# Patient Record
Sex: FEMALE | Race: WHITE | URBAN - METROPOLITAN AREA
[De-identification: names, ages, dates, MRNs, and addresses within clinical notes are randomized per-mention and may not be internally consistent; named-entity substitution may affect disease eponyms.]

---

## 2019-10-01 ENCOUNTER — DOCUMENTATION ONLY (OUTPATIENT)
Dept: ONCOLOGY | Age: 64
End: 2019-10-01

## 2019-10-02 ENCOUNTER — DOCUMENTATION ONLY (OUTPATIENT)
Dept: ONCOLOGY | Age: 64
End: 2019-10-02

## 2019-10-02 ENCOUNTER — OFFICE VISIT (OUTPATIENT)
Dept: ONCOLOGY | Age: 64
End: 2019-10-02

## 2019-10-02 VITALS
RESPIRATION RATE: 18 BRPM | DIASTOLIC BLOOD PRESSURE: 84 MMHG | OXYGEN SATURATION: 96 % | HEART RATE: 106 BPM | HEIGHT: 68 IN | SYSTOLIC BLOOD PRESSURE: 184 MMHG | TEMPERATURE: 98.1 F | WEIGHT: 177 LBS | BODY MASS INDEX: 26.83 KG/M2

## 2019-10-02 DIAGNOSIS — Z17.0 MALIGNANT NEOPLASM OF BREAST IN FEMALE, ESTROGEN RECEPTOR POSITIVE, UNSPECIFIED LATERALITY, UNSPECIFIED SITE OF BREAST (HCC): Primary | ICD-10-CM

## 2019-10-02 DIAGNOSIS — C50.919 MALIGNANT NEOPLASM OF BREAST IN FEMALE, ESTROGEN RECEPTOR POSITIVE, UNSPECIFIED LATERALITY, UNSPECIFIED SITE OF BREAST (HCC): Primary | ICD-10-CM

## 2019-10-02 RX ORDER — LANOLIN ALCOHOL/MO/W.PET/CERES
1000 CREAM (GRAM) TOPICAL DAILY
COMMUNITY

## 2019-10-02 RX ORDER — FUROSEMIDE 20 MG/1
TABLET ORAL DAILY
COMMUNITY

## 2019-10-02 RX ORDER — LORAZEPAM 1 MG/1
TABLET ORAL
COMMUNITY

## 2019-10-02 RX ORDER — MULTIVITAMIN
TABLET ORAL
COMMUNITY

## 2019-10-02 RX ORDER — DILTIAZEM HYDROCHLORIDE 60 MG/1
180 TABLET, FILM COATED ORAL 2 TIMES DAILY
COMMUNITY

## 2019-10-02 RX ORDER — RANITIDINE 150 MG/1
150 TABLET, FILM COATED ORAL 2 TIMES DAILY
COMMUNITY

## 2019-10-02 RX ORDER — CHOLECALCIFEROL (VITAMIN D3) 125 MCG
CAPSULE ORAL
COMMUNITY

## 2019-10-02 RX ORDER — TAMOXIFEN CITRATE 10 MG/1
TABLET, FILM COATED ORAL DAILY
COMMUNITY
End: 2020-06-05 | Stop reason: SDUPTHER

## 2019-10-02 NOTE — PROGRESS NOTES
Pt is here for a yearly follow up, she denies any new concerns, feels well.     Visit Vitals  /84   Pulse (!) 106   Temp 98.1 °F (36.7 °C)   Resp 18   Ht 5' 7.5\" (1.715 m)   Wt 177 lb (80.3 kg)   SpO2 96%   BMI 27.31 kg/m²       Pain Scale: 0 - No pain/10  Pain Location:

## 2019-10-02 NOTE — PROGRESS NOTES
This note will not be viewable in [address] [facility]  Social Work Navigator Encounter     Patient Name:  Myke Castro     Medical History: Breast Cancer     Advance Directives: Patient does not have an advanced medical directive, and did not express interest in completing one today. Narrative:   Note that this patient is an existing patient of Dr. Albert Bailey from [place], [place]. The patient was initially diagnosed and treated for breast cancer in 2014. She is on Tamoxifen. This  is available if any needs arise. Barriers to Care:   No assessment completed at this time. Assessment/Action:  No assessment completed at this time. Plan/Referral:   No assessment completed at this time.    Walter Mauricio LCSW

## 2019-10-02 NOTE — PROGRESS NOTES
[facility] [place] [address], [place]: [phone]  F: [phone]    Reason for Visit:   Yaa Lucio is a 71 y.o. female who is seen in the office for breast cancer    Treatment History:   · Dx breast cancer in 4/2014 [facility] [id number] ER/SC positive HER2 1+ positive, 2/14 nodes positive  · S/p bilateral mastectomy 05/14  · S/p docetaxel and cytoxan x4 cycles from 7/16/14-9/17/14  · Tamoxifen 20 mg daily since 09/14 plan is to continue until 9/24    History of Present Illness: This is a very pleasant 71year old who is seen in the office for breast cancer. Patient is known to me from [place]. Patient has history of breast cancer diagnosed in 2014 s/p bilateral mastectomy and reconsturction. She completed chemotherapy in 2014. She has been taking tamoxifen 20 mg daily since 09/2014. She tolerates this well. She does report mild hot flashes. She has been traveling frequently with her . She is walking 6 miles daily on her treadmill, since it has been hot outside. No past medical history on file. No past surgical history on file. Social History     Tobacco Use    Smoking status: Not on file   Substance Use Topics    Alcohol use: Not on file      No family history on file. No current outpatient medications on file. No current facility-administered medications for this visit. Allergies not on file     Review of Systems: A complete review of systems was obtained, negative except as described above.     Physical Exam:     Visit Vitals  /84   Pulse (!) 106   Temp 98.1 °F (36.7 °C)   Resp 18   Ht 5' 7.5\" (1.715 m)   Wt 177 lb (80.3 kg)   SpO2 96%   BMI 27.31 kg/m²     ECOG PS: 0  General: No distress  Eyes: PERRL, anicteric sclerae  HENT: Atraumatic  Neck: Supple  Lymphatic: No cervical, supraclavicular, or axillary adenopathy  Respiratory: CTAB, normal respiratory effort  CV: Normal rate, regular rhythm, no murmurs, no peripheral edema  GI: Soft, nontender, nondistended, no masses  MS: Normal gait and station  Skin: Warm and dry. No rashes, ecchymoses, or petechiae on exposed skin  Psych: Alert, oriented, appropriate affect, normal judgment/insight    Results:   No results found for: WBC, HGB, HCT, PLT, MCV, ANEU, HGBPOC, HCTPOC, HGBEXT, HCTEXT, PLTEXT  No results found for: NA, K, CL, CO2, GLU, BUN, CREA, GFRAA, GFRNA, CA, NAPOC, KPOCT, CLPOC, GLUCPOC, IBUN, CREAPOC, ICAI  No results found for: TBILI, ALT, SGOT, AP, TP, ALB, GLOB    Outside records reviewed from [facility]    Assessment:   1) Right sided breast cancer ER/PRpositive HER2 1+ positive  S/p bilateral mastectomy 5/20/14, 2/14 nodes positive  S/p docetaxel and cytoxan x4 cycles from 7/16/`4-9/17/14   Patient has been on Tamoxifen and toelrating well. No AI due to fibromyalgia and arthritis  No need for mammogram due to bilateral mastectomy    2) Hot flashes  Mild, grade 1  Secondary to Tamoxifen    3) Family history of BRCA mutation  Patient does not have the mutation   Patient's mother had breast cancer and is also s/p mastectomy  Patients maternal aunt also had breast cancer    3) Fibromyalgia/arthritis  Management per PCP    Plan:     · Continue Tamoxifen for a total of 10 years   · Follow up in 1 year    I appreciate the opportunity to participate in Ms. Mary Porter's care.     Signed By: Dr. Silvia Mark

## 2020-06-05 RX ORDER — TAMOXIFEN CITRATE 10 MG/1
10 TABLET, FILM COATED ORAL DAILY
Qty: 90 TAB | Refills: 3 | OUTPATIENT
Start: 2020-06-05 | End: 2020-06-08 | Stop reason: SDUPTHER

## 2020-06-08 ENCOUNTER — TELEPHONE (OUTPATIENT)
Dept: ONCOLOGY | Age: 65
End: 2020-06-08

## 2020-06-08 RX ORDER — TAMOXIFEN CITRATE 10 MG/1
10 TABLET, FILM COATED ORAL DAILY
Qty: 90 TAB | Refills: 3 | Status: SHIPPED | OUTPATIENT
Start: 2020-06-08 | End: 2021-06-03

## 2023-05-23 RX ORDER — DILTIAZEM HYDROCHLORIDE 60 MG/1
180 TABLET, FILM COATED ORAL 2 TIMES DAILY
COMMUNITY

## 2023-05-23 RX ORDER — AMPICILLIN TRIHYDRATE 250 MG
CAPSULE ORAL
COMMUNITY

## 2023-05-23 RX ORDER — FUROSEMIDE 20 MG/1
TABLET ORAL DAILY
COMMUNITY

## 2023-05-23 RX ORDER — RANITIDINE 150 MG/1
150 TABLET ORAL 2 TIMES DAILY
COMMUNITY

## 2023-05-23 RX ORDER — LORAZEPAM 1 MG/1
TABLET ORAL EVERY 4 HOURS PRN
COMMUNITY